# Patient Record
(demographics unavailable — no encounter records)

---

## 2018-08-04 NOTE — RAD
RADIOGRAPH CHEST 1 VIEW:

 

HISTORY: 

55-year-old female with dyspnea. 

 

FINDINGS:

There are no air space densities, pulmonary edema, pneumothorax, or cardiomegaly.  The lateral costop
hrenic angles are sharp.

 

IMPRESSION:  

No acute cardiopulmonary findings.

 

 

cristine [] 

 

POS: [facility]RO[facility]

## 2018-09-19 NOTE — RAD
RIGHT FINGER TWO VIEWS:

 

HISTORY:

Trauma.

 

COMPARISON:

None.

 

FINDINGS:

There is traumatic amputation through the distal phalanx diaphysis and the overlying soft tissues of 
the index finger.  Mild degenerative changes of the thumb carpometacarpal joint.

 

IMPRESSION:

Traumatic, obliquely oriented soft tissue and osseous amputation of the distal phalanx diaphysis of t
he index finger.

 

POS: JANETTE

## 2018-09-19 NOTE — HP
ADMITTING PHYSICIAN:  Dr. John Peñaloza

 

DATE OF SERVICE:  09/19/2018 

 

REQUESTING PHYSICIAN:  Dr. Rosario Jacobson, Emergency Services

 

REASON FOR ADMISSION:  Right hand index finger amputation.

 

HISTORY OF PRESENT ILLNESS:  This is a 55-year-old female who states that her 
dogs got into a fight on Monday of this week.  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 
is blind and mistakenly bit her right hand index finger.  She states that it 
took the end of the finger off.  She was seen at [facility] Monday 
evening.  Her finger was cleaned and wrapped up.  She presents today for higher 
level of care to be evaluated by Orthopedics.  The patient does report some 
numbness in the right hand.  She is a diabetic who takes pills as well as 
insulin.  She is right hand dominant.  She states her dog was up to date on all 
shots.  She denies any other injuries.  Pain is constant and throbbing.  She 
was unable to afford her antibiotic pills at the pharmacy.

 

PAST MEDICAL HISTORY:  Hypertension, hyperlipidemia, diabetes, anxiety, and 
depression as well as asthma.

 

PAST SURGICAL HISTORY:  Includes hysterectomy, cholecystectomy, left shoulder 
surgery and right knee surgery.

 

SOCIAL HISTORY:  The patient states that she smokes approximately half a pack 
per day of cigarettes for the last 10 years.  She drinks socially.  No history 
of illicit drug use.  She lives at home with family in [place], [place].  She 
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

 

FAMILY HISTORY:  Reviewed and noncontributory.

 

ALLERGIES:  No known drug allergies.

 

REVIEW OF SYSTEMS:  A 12 point review of systems was conducted and otherwise 
negative except for as stated above.

 

PHYSICAL EXAMINATION:

VITAL SIGNS:  Blood pressure 127/78, pulse of 100, respiratory rate of 20, 
temperature 98.3.

GENERAL:  The patient is awake and alert.  She is in no acute distress.  She is 
pleasant and cooperative with exam findings today.  Her daughter is present at 
bedside.

HEENT:  Head is normocephalic, atraumatic.

NECK:  Supple.  Trachea midline.  

CHEST:  Breathing nonlabored.

EXTREMITIES:  The right upper extremity was evaluated.  There is an amputation 
to the right index finger at the distal phalanx.  This appears to be distal to 
the DIP joint.  There is no skin covering the bone.  This is exposed.  There is 
no active bleeding.  Sensation is decreased in the entire digit.  The patient 
has active movement in all 4 other digits of this hand.  Active motion in the 
wrist.  No erythema, streaking up the arm.  No other extremity injuries noted 
on exam.  

 

Radiographic images taken today including views of the right index finger show 
amputation at the diaphysis of the distal phalanx of the right index finger.  
This appears oblique in orientation.  No other acute findings.

 

ASSESSMENT:  Right index finger amputation at the distal phalanx with exposed 
bone from 2 days ago.

 

PLAN:  At this time, due to the patient's history of diabetes and the fact that 
she has been unable to afford antibiotics since the time of her injury, would 
like to take her to the operating room for emergent I&D as well as hopeful 
wound closure.  We will probably have to rongeur off some of this bone in order 
to get the skin to close over this.  Risks, benefits, and alternatives 
discussed at length with the patient.  She verbalized understanding and is 
amenable to this plan of care.  They are ready to go forward with this 
procedure.  She has been n.p.o. since yesterday afternoon.  We will plan for 
surgery later this afternoon.  She will be admitted for IV antibiotics 
following the procedure.

 

DONOVAN

## 2018-09-20 NOTE — OP
DATE OF PROCEDURE:  09/19/2018.

 

PREOPERATIVE DIAGNOSIS:  Right index fingertip amputation.

 

POSTOPERATIVE DIAGNOSIS:  Right index fingertip amputation.

 

SURGICAL PROCEDURE:  Irrigation and debridement of right index finger distal phalanx and partial woun
d closure of right index finger amputation.

 

ANESTHESIA:  General.

 

SURGEON:  Dr. John Peñaloza.

 

TOURNIQUET TIME:  Zero.

 

SPECIMEN:  Bone from distal phalanx discarded.

 

COMPLICATIONS:  None.

 

DRAINS:  None.

 

INDICATIONS:  The patient is a 55-year-old lady status post dog bite wound to right index fingertip w
ith loss of the fingertip as well as loss of the distal end of the distal phalanx.  This was original
ly seen and evaluated in the [place] emergency room.  The patient had difficulty with transporta
tion to [place] and now presents 48 hours post-injury for revision and shortening of distal phalan
x to allow for soft tissue closure.  Informed consent has been obtained.  I believe all questions hav
e been answered.

 

DESCRIPTION OF PROCEDURE:  The patient was brought to the operating room and a timeout performed foll
owed by induction of general anesthesia.  Next, a sterile prep and drape was performed of the right u
pper extremity.  The index finger tip was remarkable for relatively healthy appearing soft tissue, bu
t the distal phalanx was exposed in the middle of the soft tissue ring.  Subperiosteal dissection was
 performed using an elevator around the exposed distal phalanx such that the distal 1 cm of the bone 
could be visualized.  This was then debrided with the rongeur bringing the bone back to a level where
 the soft tissue could be reapproximated over the tip of the bone.  After the debridement of the bone
, some marginally viable skin was also sharply debrided using a scalpel as well as some subcutaneous 
fat.  Once the soft tissue was debrided and found to be bleeding, 3-0 nylon was used with just 2 inte
rrupted sutures to bring the volar flap in approximation with the dorsal flap.  This resulted in clos
ure of the bone, but not tight closure of the wound given the fact that this was 48 hours out from in
jury.  Following this, simple reapproximation of tissue over the tip of the distal phalanx, the wound
 was dressed with Xeroform, gauze and a tube gauze dressing.  The patient was then transferred to rec
[facility] room in stable condition.  There were no complications.  She tolerated the procedure well.  She
 will be discharged home on Augmentin for infection prophylaxis.

## 2018-09-28 NOTE — PQF
[facility]

POST DISCHARGE CLINICAL DOCUMENTATION IMPROVEMENT CLARIFICATION FORM 



 l



 Todays Date: 09/27/18

  l

Patients Name Westley rodríguez

MRN [id number]

  l

Acct # [id number]

  l

Admit Date 09/19/2018

  l

Disch Date 09/19/2018

  l

 









  Name Laurent Hyman

Email: [email or web address]

Cell: [phone] 

 

 











Present Clinical Indicators - Signs / Symptoms Results and Location in Medical 
Record 

 

[ ] Documentation of: 

 

[ ]  

 

[ ] Documentation of: 

 

[ ]  

 

[ ] Documentation of: 

 

[ ]  

 

[ ] Documentation of: 

 

[ ]  

 

[ ]  

 

Risks  

 

[ ]  

 

[ ]  

 

[ ]  

 

Treatment  

 

[ ] Missing wound closure size for finger   Please specify the closure size for 
wound finger 

 

[ ]  

 

[ ]  





Dr. Ann Lopez

The documentation in this patients record requires clarification to ensure 
coding compliance and accuracy. 



Check the appropriate box and include in your discharge summary. 

[ ] _____________________________________________

[ ] Please check this box if this does not apply to this patient

[ ] Unable to determine

[ ] Other diagnosis: ________________________________



Review the following information and exercise your independent professional 
judgment in responding to the clarification. Based upon the clinical findings, 
risk factors, and treatment, please clarify if you are treating one of the 
above probable or suspected diagnoses. 





Physician Signature: _____________________________ Date______________ Time______
__________

MTDD

## 2018-10-01 NOTE — PQF
POST DISCHARGE CLINICAL DOCUMENTATION IMPROVEMENT CLARIFICATION FORM 



 l



 Todays Date: 10/01/2018

  l

Patients Name Tone Christy

MRN [id number]

  l

Acct # [id number]

  l

Admit Date 9/19/2018

  l

Disch Date 9/19/2018









  Contact Name:

Email: 

Cell: 











Present Clinical Indicators - Signs / Symptoms Results and Location in Medical 
Record 

 

[ ] Documentation of: 

 

[ ]  

 

[ ]  

 

[ ]  

 

Risks  

 

[ ]  

 

[ ]  

 

[ ]  

 

Treatment  

 

[ ] Wound closure of finger Please specify the wound closure size for the 
finger.

 

[ ]  

 

[ ]  





Dr. John Peñaloza

The documentation in this patients record requires clarification to ensure 
coding compliance and accuracy. 



Check the appropriate box and include in your discharge summary/addendum. 

[ x] __2 cm___________________________________________

[ ] _____________________________________________

[ ] Please check this box if this does not apply to this patient

[ ] Unable to determine

[ ] Other diagnosis/procedure: ________________________________

Review the following information and exercise your independent professional 
judgment in responding to the clarification. Based upon the clinical findings, 
risk factors, and treatment, please clarify if you are treating one of the 
above probable or suspected diagnoses. 





Physician Signature: _____________________________ Date______________ Time______
__________
MTDD